# Patient Record
Sex: MALE | Race: WHITE | NOT HISPANIC OR LATINO | Employment: OTHER | URBAN - METROPOLITAN AREA
[De-identification: names, ages, dates, MRNs, and addresses within clinical notes are randomized per-mention and may not be internally consistent; named-entity substitution may affect disease eponyms.]

---

## 2019-07-31 ENCOUNTER — HOSPITAL ENCOUNTER (OUTPATIENT)
Dept: RADIOLOGY | Facility: HOSPITAL | Age: 69
Discharge: HOME | End: 2019-07-31
Attending: ORTHOPAEDIC SURGERY
Payer: MEDICARE

## 2019-07-31 DIAGNOSIS — M79.672 LEFT FOOT PAIN: Primary | ICD-10-CM

## 2019-07-31 DIAGNOSIS — M79.672 LEFT FOOT PAIN: ICD-10-CM

## 2019-07-31 PROCEDURE — 73630 XR FOOT COMPLETE 3 VIEW LEFT: ICD-10-PCS | Mod: 26,LT,, | Performed by: RADIOLOGY

## 2019-07-31 PROCEDURE — 73630 X-RAY EXAM OF FOOT: CPT | Mod: TC,LT

## 2019-07-31 PROCEDURE — 73630 X-RAY EXAM OF FOOT: CPT | Mod: 26,LT,, | Performed by: RADIOLOGY

## 2021-12-29 ENCOUNTER — LAB VISIT (OUTPATIENT)
Dept: FAMILY MEDICINE | Facility: CLINIC | Age: 71
End: 2021-12-29
Payer: MEDICARE

## 2021-12-29 DIAGNOSIS — U07.1 COVID-19: Primary | ICD-10-CM

## 2021-12-29 LAB
CTP QC/QA: YES
SARS-COV-2 AG RESP QL IA.RAPID: NEGATIVE

## 2021-12-29 PROCEDURE — 87811 SARS-COV-2 COVID19 W/OPTIC: CPT | Mod: PBBFAC

## 2025-06-13 ENCOUNTER — TELEPHONE (OUTPATIENT)
Dept: ENDOSCOPY | Facility: HOSPITAL | Age: 75
End: 2025-06-13
Payer: MEDICARE

## 2025-06-16 ENCOUNTER — TELEPHONE (OUTPATIENT)
Dept: ENDOSCOPY | Facility: HOSPITAL | Age: 75
End: 2025-06-16
Payer: MEDICARE

## 2025-06-16 NOTE — TELEPHONE ENCOUNTER
Per Dr. Cortes review EUS +/- ERCP within 7 days (CMP right before procedure to check LFTs) for uncinate process mass lesion with periportal lymph nodes as well, any provider, any campus, have Filiberto visit with patient virtually on Monday or Tuesday to review (not to delay the date of the procedure) and discuss procedures.

## 2025-06-17 ENCOUNTER — OFFICE VISIT (OUTPATIENT)
Dept: GASTROENTEROLOGY | Facility: CLINIC | Age: 75
End: 2025-06-17
Payer: MEDICARE

## 2025-06-17 ENCOUNTER — TELEPHONE (OUTPATIENT)
Dept: GASTROENTEROLOGY | Facility: CLINIC | Age: 75
End: 2025-06-17
Payer: MEDICARE

## 2025-06-17 VITALS — BODY MASS INDEX: 25.74 KG/M2 | HEIGHT: 72 IN | WEIGHT: 190.06 LBS

## 2025-06-17 DIAGNOSIS — D49.0 PANCREAS NEOPLASM: Primary | ICD-10-CM

## 2025-06-17 DIAGNOSIS — R79.89 ELEVATED LFTS: Primary | ICD-10-CM

## 2025-06-17 DIAGNOSIS — R93.89 ABNORMAL FINDINGS ON IMAGING TEST: ICD-10-CM

## 2025-06-17 DIAGNOSIS — D49.0 PANCREAS NEOPLASM: ICD-10-CM

## 2025-06-17 PROCEDURE — 99213 OFFICE O/P EST LOW 20 MIN: CPT | Mod: PBBFAC

## 2025-06-17 PROCEDURE — 99214 OFFICE O/P EST MOD 30 MIN: CPT | Mod: S$PBB,,,

## 2025-06-17 PROCEDURE — 99999 PR PBB SHADOW E&M-EST. PATIENT-LVL III: CPT | Mod: PBBFAC,,,

## 2025-06-17 RX ORDER — ROSUVASTATIN CALCIUM 40 MG/1
40 TABLET, COATED ORAL NIGHTLY
COMMUNITY

## 2025-06-17 RX ORDER — TAMSULOSIN HYDROCHLORIDE 0.4 MG/1
0.4 CAPSULE ORAL DAILY
COMMUNITY

## 2025-06-17 RX ORDER — PANTOPRAZOLE SODIUM 40 MG/1
40 TABLET, DELAYED RELEASE ORAL ONCE AS NEEDED
COMMUNITY

## 2025-06-17 NOTE — TELEPHONE ENCOUNTER
Patient is scheduled for a EUS/ERCP  on 06/20/2025 with Dr. HILARY Layne  Referral for procedure from Clinic visit.    Dr. Jefferson's office contacted to fax over Eliquis hold ASAP.

## 2025-06-17 NOTE — H&P (VIEW-ONLY)
[facility]    Reason for Visit:  There were no encounter diagnoses.    PCP:   Emile Jefferson   No address on file      Initial HPI   This is a 74 y.o. male with pmhx DVT on eliquis presenting for pancreatic mass. Patient initially had a CT renal stone study 6/10 which showed a 3.6 x 3 cm rounded region of soft tissue density inferior to the pancreatic uncinate process which appears newly developed from prior, abutting a duodenal diverticulum. Subsequent CT performed 6/12 which demonstrated a 2.9 x 2.1 x 2.9 cm hypodense mass in the uncinate process of the pancreas, consistent with pancreatic neoplasm. There is also multiple hypodense hepatic metastases measuring up to 2 cm in the posterior right lobe. Multiple metastatic periportal lymph nodes up to 3.4 cm. Unremarkable gallbladder, spleen, and adrenal glands.     LFTs 6/12 with Tbili 1.2, , AST 67, and ALT 65. CA 19-9 normal. CEA normal.  Patient endorses some intermittent LLQ discomfort. Denies any upper abdominal pain, NV, fever, jaundice. He has noticed about 9 lbs of weight loss recently. Denies pmhx of pancreatitis. He endorses that his brother possibly had pancreatic cancer, though he is unsure. Patient does not smoke. Denies hx of diabetes. He takes eliquis for recent DVT       ROS:  Review of Systems   Constitutional:  Positive for weight loss. Negative for chills and fever.   Gastrointestinal:  Positive for abdominal pain. Negative for blood in stool, constipation, diarrhea, heartburn, melena, nausea and vomiting.   Skin:  Negative for itching and rash.        Medical History:  has no past medical history on file.    Surgical History:  has no past surgical history on file.    Family History: family history is not on file..       Review of patient's allergies indicates:  Not on File    Medications Ordered Prior to Encounter[1]      Objective Findings: (Limited due to virtual nature of encounter)    Physical Exam:  Physical  Exam  Constitutional:       General: He is not in acute distress.     Appearance: Normal appearance. He is not ill-appearing.   Eyes:      General: No scleral icterus.  Abdominal:      General: Abdomen is flat. Bowel sounds are normal. There is no distension.      Palpations: Abdomen is soft. There is no mass.      Tenderness: There is no abdominal tenderness. There is no guarding or rebound.      Hernia: No hernia is present.   Skin:     General: Skin is warm and dry.      Coloration: Skin is not jaundiced or pale.   Neurological:      Mental Status: He is alert and oriented to person, place, and time. Mental status is at baseline.             Labs:  Lab Results   Component Value Date    WBC 11.90 06/12/2025    HGB 14.7 06/12/2025    HCT 44.0 06/12/2025    PLT 47 (LL) 06/12/2025    ALKPHOS 236 (H) 06/12/2025    ALT 65 (H) 06/12/2025    AST 67 (H) 06/12/2025     06/12/2025    K 5.0 06/12/2025     06/12/2025    CREATININE 1.88 (H) 06/12/2025    BUN 22 (H) 06/12/2025    CO2 28 06/12/2025             Assessment:  No diagnosis found.     This is a 74 y.o. male with pmhx DVT on eliquis presenting for pancreatic mass. Patient initially had a CT renal stone study 6/10 which showed a 3.6 x 3 cm rounded region of soft tissue density inferior to the pancreatic uncinate process which appears newly developed from prior, abutting a duodenal diverticulum. Subsequent CT performed 6/12 which demonstrated a 2.9 x 2.1 x 2.9 cm hypodense mass in the uncinate process of the pancreas, consistent with pancreatic neoplasm. There is also multiple hypodense hepatic metastases measuring up to 2 cm in the posterior right lobe. Multiple metastatic periportal lymph nodes up to 3.4 cm. Unremarkable gallbladder, spleen, and adrenal glands.     LFTs 6/12 with Tbili 1.2, , AST 67, and ALT 65. CA 19-9 normal. CEA normal.  Patient endorses some intermittent LLQ discomfort. Denies any upper abdominal pain, NV, fever, jaundice. He  has noticed about 9 lbs of weight loss recently. Denies pmhx of pancreatitis. He endorses that his brother possibly had pancreatic cancer, though he is unsure. Patient does not smoke. Denies hx of diabetes. He takes eliquis for recent DVT    Recommendations:  I will arrange for urgent EUS/+/- ERCP in the next week for evaluation of pancreatic mass. Will arrange for repeat LFTs to be performed the day of procedure to help determine if ERCP is needed  Explained the EUS/ERCP procedure in detail and answered the patients questions regarding this procedure  Went over the risks of the procedure including but not limited to risks of anesthesia, bleeding, perforation, infection, pancreatitis- he verbalized understanding of these risks and would like to proceed with EUS/ERCP  We will arrange for clearance to hold eliquis prior to procedure given recent DVT            Thank you so much for allowing me to participate in the care of Robert Lopez PA-C  Advanced Endoscopy Physician Assistant  [facility]                 [1]   No current outpatient medications on file prior to visit.     No current facility-administered medications on file prior to visit.

## 2025-06-18 ENCOUNTER — TELEPHONE (OUTPATIENT)
Dept: GASTROENTEROLOGY | Facility: CLINIC | Age: 75
End: 2025-06-18
Payer: MEDICARE

## 2025-06-20 ENCOUNTER — ANESTHESIA EVENT (OUTPATIENT)
Dept: ENDOSCOPY | Facility: HOSPITAL | Age: 75
End: 2025-06-20
Payer: MEDICARE

## 2025-06-20 ENCOUNTER — HOSPITAL ENCOUNTER (OUTPATIENT)
Facility: HOSPITAL | Age: 75
Discharge: HOME | End: 2025-06-20
Attending: INTERNAL MEDICINE | Admitting: INTERNAL MEDICINE
Payer: MEDICARE

## 2025-06-20 ENCOUNTER — ANESTHESIA (OUTPATIENT)
Dept: ENDOSCOPY | Facility: HOSPITAL | Age: 75
End: 2025-06-20
Payer: MEDICARE

## 2025-06-20 VITALS
BODY MASS INDEX: 25.73 KG/M2 | OXYGEN SATURATION: 99 % | RESPIRATION RATE: 20 BRPM | HEART RATE: 52 BPM | HEIGHT: 72 IN | WEIGHT: 190 LBS | SYSTOLIC BLOOD PRESSURE: 160 MMHG | TEMPERATURE: 98 F | DIASTOLIC BLOOD PRESSURE: 61 MMHG

## 2025-06-20 DIAGNOSIS — D49.0 PANCREAS NEOPLASM: ICD-10-CM

## 2025-06-20 DIAGNOSIS — K86.89 PANCREATIC MASS: ICD-10-CM

## 2025-06-20 PROCEDURE — 43242 EGD US FINE NEEDLE BX/ASPIR: CPT | Mod: ,,, | Performed by: INTERNAL MEDICINE

## 2025-06-20 PROCEDURE — 63600175 PHARM REV CODE 636 W HCPCS: Performed by: NURSE ANESTHETIST, CERTIFIED REGISTERED

## 2025-06-20 PROCEDURE — 43242 EGD US FINE NEEDLE BX/ASPIR: CPT | Performed by: INTERNAL MEDICINE

## 2025-06-20 PROCEDURE — 88305 TISSUE EXAM BY PATHOLOGIST: CPT | Mod: TC,59 | Performed by: INTERNAL MEDICINE

## 2025-06-20 PROCEDURE — 25000003 PHARM REV CODE 250: Performed by: NURSE ANESTHETIST, CERTIFIED REGISTERED

## 2025-06-20 RX ORDER — LIDOCAINE HYDROCHLORIDE 20 MG/ML
INJECTION INTRAVENOUS
Status: DISCONTINUED | OUTPATIENT
Start: 2025-06-20 | End: 2025-06-24

## 2025-06-20 RX ORDER — OXYCODONE AND ACETAMINOPHEN 5; 325 MG/1; MG/1
1 TABLET ORAL
Status: DISCONTINUED | OUTPATIENT
Start: 2025-06-20 | End: 2025-06-20 | Stop reason: HOSPADM

## 2025-06-20 RX ORDER — PROPOFOL 10 MG/ML
VIAL (ML) INTRAVENOUS
Status: DISCONTINUED | OUTPATIENT
Start: 2025-06-20 | End: 2025-06-24

## 2025-06-20 RX ORDER — KETOROLAC TROMETHAMINE 15 MG/ML
15 INJECTION, SOLUTION INTRAMUSCULAR; INTRAVENOUS EVERY 8 HOURS PRN
Status: DISCONTINUED | OUTPATIENT
Start: 2025-06-20 | End: 2025-06-20 | Stop reason: HOSPADM

## 2025-06-20 RX ORDER — HALOPERIDOL LACTATE 5 MG/ML
0.5 INJECTION, SOLUTION INTRAMUSCULAR EVERY 10 MIN PRN
Status: DISCONTINUED | OUTPATIENT
Start: 2025-06-20 | End: 2025-06-20 | Stop reason: HOSPADM

## 2025-06-20 RX ORDER — SODIUM CHLORIDE 0.9 % (FLUSH) 0.9 %
10 SYRINGE (ML) INJECTION
Status: DISCONTINUED | OUTPATIENT
Start: 2025-06-20 | End: 2025-06-20 | Stop reason: HOSPADM

## 2025-06-20 RX ORDER — GLUCAGON 1 MG
1 KIT INJECTION
Status: DISCONTINUED | OUTPATIENT
Start: 2025-06-20 | End: 2025-06-20 | Stop reason: HOSPADM

## 2025-06-20 RX ORDER — HYDROMORPHONE HYDROCHLORIDE 1 MG/ML
0.2 INJECTION, SOLUTION INTRAMUSCULAR; INTRAVENOUS; SUBCUTANEOUS EVERY 10 MIN PRN
Status: DISCONTINUED | OUTPATIENT
Start: 2025-06-20 | End: 2025-06-20 | Stop reason: HOSPADM

## 2025-06-20 RX ADMIN — GLYCOPYRROLATE 0.2 MG: 0.2 INJECTION, SOLUTION INTRAMUSCULAR; INTRAVENOUS at 01:06

## 2025-06-20 RX ADMIN — SODIUM CHLORIDE: 0.9 INJECTION, SOLUTION INTRAVENOUS at 01:06

## 2025-06-20 RX ADMIN — LIDOCAINE HYDROCHLORIDE 50 MG: 20 INJECTION INTRAVENOUS at 01:06

## 2025-06-20 RX ADMIN — PROPOFOL 50 MG: 10 INJECTION, EMULSION INTRAVENOUS at 01:06

## 2025-06-20 RX ADMIN — PROPOFOL 150 MCG/KG/MIN: 10 INJECTION, EMULSION INTRAVENOUS at 01:06

## 2025-06-20 NOTE — ANESTHESIA PREPROCEDURE EVALUATION
[facility]  Anesthesia Pre-Operative Evaluation     Patient Name: Robert Franco  YOB: 1950  MRN: [id number]  [facility]: [id number]       Admit Date: 6/20/2025   Admit Team: Networked reference to record PCT   Hospital Day: 1  Date of Procedure: 6/20/2025  Anesthesia: Choice Procedure: Procedure(s) (LRB):  ULTRASOUND, UPPER GI TRACT, ENDOSCOPIC (N/A)  ERCP (ENDOSCOPIC RETROGRADE CHOLANGIOPANCREATOGRAPHY) (N/A)  Pre-Operative Diagnosis: Pancreas neoplasm [D49.0]  Proceduralist:Surgeons and Role:     * Travis Layne MD - Primary  Code Status: Full Code   Advanced Directive: <no information>  Isolation Precautions: No active isolations  Capacity: Full capacity     SUBJECTIVE:   Robert Franco is a 74 y.o. male who  has a past medical history of GERD (gastroesophageal reflux disease).  No notes on file    Hospital LOS: 0 days  ICU LOS: Patient does not have an ICU stay during this admission.    he has a current medication list which includes the following long-term medication(s): pantoprazole, rosuvastatin, and tamsulosin.   Current Outpatient Medications   Medication Instructions    apixaban (ELIQUIS) 5 mg, 2 times daily    pantoprazole (PROTONIX) 40 mg, Once as needed    rosuvastatin (CRESTOR) 40 mg, Nightly    tamsulosin (FLOMAX) 0.4 mg, Daily     ALLERGIES:     Review of patient's allergies indicates:   Allergen Reactions    Pcn [penicillins]      LDA:   AIRWAY:         * No LDAs found *      Lines/Drains/Airways       Peripheral Intravenous Line  Duration             Peripheral IV 06/20/25 1208 20 G 1 in Left;Posterior Hand <1 day                   Anesthesia Evaluation      Airway   Mallampati: III  TM distance: Normal  Neck ROM: Normal ROM  Dental    (+) Intact    Pulmonary    Cardiovascular     Neuro/Psych      GI/Hepatic/Renal    (+) GERD    Endo/Other    Abdominal                    MEDICATIONS:     Current Outpatient Medications on File Prior to Encounter   Medication Sig  Dispense Refill Last Dose/Taking    apixaban (ELIQUIS) 5 mg Tab Take 5 mg by mouth 2 (two) times daily.   Past Week    pantoprazole (PROTONIX) 40 MG tablet Take 40 mg by mouth once as needed.   Past Week    rosuvastatin (CRESTOR) 40 MG Tab Take 40 mg by mouth every evening.   6/19/2025    tamsulosin (FLOMAX) 0.4 mg Cap Take 0.4 mg by mouth once daily.   6/19/2025      Inpatient Medications:  Antibiotics (From admission, onward)      None          VTE Risk Mitigation (From admission, onward)      None              Current Medications[1]       History:   There are no hospital problems to display for this patient.    Surgical History:    has a past surgical history that includes Appendectomy; Colonoscopy; and Polypectomy.   Social History:    has no history on file for sexual activity.  reports that he has quit smoking. His smoking use included cigarettes. He has never used smokeless tobacco. He reports that he does not use drugs.    Vitals:    06/20/25 1206   BP: (!) 148/62   BP Location: Right arm   Patient Position: Lying   Pulse: 72   Resp: 16   Temp: 36.8 °C (98.2 °F)   TempSrc: Temporal   SpO2: 97%   Weight: 86.2 kg (190 lb)   Height: 6' (1.829 m)     Vital Signs Range (Last 24H):  Temp:  [36.8 °C (98.2 °F)]   Pulse:  [72]   Resp:  [16]   BP: (148)/(62)   SpO2:  [97 %]     Body mass index is 25.77 kg/m².  Wt Readings from Last 4 Encounters:   06/20/25 86.2 kg (190 lb)   06/17/25 86.2 kg (190 lb 0.6 oz)        Intake/Output - Last 3 Shifts       None          Lab Results   Component Value Date    WBC 11.90 06/12/2025    HGB 14.7 06/12/2025    HCT 44.0 06/12/2025    PLT 47 (LL) 06/12/2025     06/12/2025    K 5.0 06/12/2025     06/12/2025    CREATININE 1.88 (H) 06/12/2025    BUN 22 (H) 06/12/2025    CO2 28 06/12/2025     (H) 06/12/2025    CALCIUM 9.7 06/12/2025    ALKPHOS 236 (H) 06/12/2025    ALT 65 (H) 06/12/2025    AST 67 (H) 06/12/2025    ALBUMIN 4.6 06/12/2025     No results found for this or  "any previous visit (from the past 12 hours).  No results for input(s): "WBC", "HGB", "HCT", "PLT", "NA", "K", "CREATININE", "GLU", "INR", "LACTATE", "5HIAAPLASMA", "5HIAAURINT", "5HIAA", "5PHIH15TY" in the last 168 hours.  No LMP for male patient.    EKG:   No results found for this or any previous visit.  TTE:  No results found for this or any previous visit.    DAMARIS:  No results found for this or any previous visit.    Stress Test:  No results found for this or any previous visit.    No results found for this or any previous visit.    LHC:  No results found for this or any previous visit.    Cardiac Device Check   No results found for this or any previous visit.    No results found for this or any previous visit.        Pre-op Assessment          Review of Systems  Hepatic/GI:     GERD         Gerd              Physical Exam  General: Well nourished    Airway:  Mallampati: III / II  Mouth Opening: Normal  TM Distance: Normal  Tongue: Normal  Neck ROM: Normal ROM    Dental:  Intact        Anesthesia Plan  Type of Anesthesia, risks & benefits discussed:    Anesthesia Type: Gen ETT, Gen Natural Airway, MAC  Intra-op Monitoring Plan: Standard ASA Monitors  Post Op Pain Control Plan: multimodal analgesia and IV/PO Opioids PRN  Induction:  IV  Airway Plan: Direct and Video, Post-Induction  Informed Consent: Informed consent signed with the Patient and all parties understand the risks and agree with anesthesia plan.  All questions answered.   ASA Score: 3  Day of Surgery Review of History & Physical: H&P completed by Anesthesiologist.    Ready For Surgery From Anesthesia Perspective.     .           [1]   Current Facility-Administered Medications   Medication Dose Route Frequency Provider Last Rate Last Admin    sodium chloride 0.9% flush 10 mL  10 mL Intravenous PRN Travis Layne MD         "

## 2025-06-20 NOTE — PLAN OF CARE
ID band and fall risk band applied to pt. plan of care reviewed. pt has no questions at this time.

## 2025-06-20 NOTE — PROVATION PATIENT INSTRUCTIONS
Discharge Summary/Instructions after an Endoscopic Procedure  Patient Name: Robert Franco  Patient MRN: [id number]  Patient YOB: 1950 Friday, June 20, 2025  Travis Layne MD  Dear patient,  As a result of recent federal legislation (The Federal Cures Act), you may   receive lab or pathology results from your procedure in your [facility]   account before your physician is able to contact you. Your physician or   their representative will relay the results to you with their   recommendations at their soonest availability.  Thank you,  RESTRICTIONS:  During your procedure today, you received medications for sedation.  These   medications may affect your judgment, balance and coordination.  Therefore,   for 24 hours, you have the following restrictions:   - DO NOT drive a car, operate machinery, make legal/financial decisions,   sign important papers or drink alcohol.    ACTIVITY:  Today: no heavy lifting, straining or running due to procedural   sedation/anesthesia.  The following day: return to full activity including work.  DIET:  Eat and drink normally unless instructed otherwise.     TREATMENT FOR COMMON SIDE EFFECTS:  - Mild abdominal pain, nausea, belching, bloating or excessive gas:  rest,   eat lightly and use a heating pad.  - Sore Throat: treat with throat lozenges and/or gargle with warm salt   water.  - Because air was used during the procedure, expelling large amounts of air   from your rectum or belching is normal.  - If a bowel prep was taken, you may not have a bowel movement for 1-3 days.    This is normal.  SYMPTOMS TO WATCH FOR AND REPORT TO YOUR PHYSICIAN:  1. Abdominal pain or bloating, other than gas cramps.  2. Chest pain.  3. Back pain.  4. Signs of infection such as: chills or fever occurring within 24 hours   after the procedure.  5. Rectal bleeding, which would show as bright red, maroon, or black stools.   (A tablespoon of blood from the rectum is not serious, especially if    hemorrhoids are present.)  6. Vomiting.  7. Weakness or dizziness.  GO DIRECTLY TO THE NEAREST EMERGENCY ROOM IF YOU HAVE ANY OF THE FOLLOWING:      Difficulty breathing              Chills and/or fever over 101 F   Persistent vomiting and/or vomiting blood   Severe abdominal pain   Severe chest pain   Black, tarry stools   Bleeding- more than one tablespoon   Any other symptom or condition that you feel may need urgent attention  Your doctor recommends these additional instructions:  If any biopsies were taken, your doctors clinic will contact you in 1 to 2   weeks with any results.  - Discharge patient to home (ambulatory).   - Resume previous diet; Discharge to home (ambulatory); Resume outpatient   medications  - Return to primary care physician as previously scheduled.   - Await cytology results. Will arrange for oncology referral pending final   results.  For questions, problems or results please call your physician - Travis Layne MD at Work:  [phone].  [facility], EMERGENCY ROOM PHONE NUMBER: [phone]  IF A COMPLICATION OR EMERGENCY SITUATION ARISES AND YOU ARE UNABLE TO REACH   YOUR PHYSICIAN - GO DIRECTLY TO THE EMERGENCY ROOM.  Travis Layne MD  6/20/2025 1:59:04 PM  This report has been verified and signed electronically.  Dear patient,  As a result of recent federal legislation (The Federal Cures Act), you may   receive lab or pathology results from your procedure in your [facility]   account before your physician is able to contact you. Your physician or   their representative will relay the results to you with their   recommendations at their soonest availability.  Thank you,  PROVATION

## 2025-06-24 LAB
ESTROGEN SERPL-MCNC: ABNORMAL PG/ML
INSULIN SERPL-ACNC: ABNORMAL U[IU]/ML
LAB AP CLINICAL INFORMATION: ABNORMAL
LAB AP COMMENTS: ABNORMAL
LAB AP GROSS DESCRIPTION: ABNORMAL
LAB AP NON-GYN INTERPRETATION SPECIMEN 1: ABNORMAL
LAB AP PATHOLOGIST ADEQUACY INTERP: ABNORMAL
LAB AP PERFORMING LOCATION(S): ABNORMAL
LAB AP REPORT FOOTNOTES: ABNORMAL

## 2025-06-24 NOTE — ANESTHESIA POSTPROCEDURE EVALUATION
Anesthesia Post Evaluation    Patient: Robert Franco    Procedure(s) Performed: Procedure(s) (LRB):  ULTRASOUND, UPPER GI TRACT, ENDOSCOPIC (N/A)    Final Anesthesia Type: general      Patient location during evaluation: PACU  Patient participation: Yes- Able to Participate  Level of consciousness: awake and alert  Post-procedure vital signs: reviewed and stable  Pain management: adequate  Airway patency: patent    PONV status at discharge: No PONV  Anesthetic complications: no      Cardiovascular status: blood pressure returned to baseline  Respiratory status: unassisted  Hydration status: euvolemic  Follow-up not needed.              Vitals Value Taken Time   /61 06/20/25 15:00   Temp 36.7 °C (98 °F) 06/20/25 15:00   Pulse 52 06/20/25 15:00   Resp 20 06/20/25 15:00   SpO2 99 % 06/20/25 15:00         No case tracking events are documented in the log.      Pain/Negrita Score: No data recorded

## 2025-06-25 ENCOUNTER — TELEPHONE (OUTPATIENT)
Dept: HEMATOLOGY/ONCOLOGY | Facility: CLINIC | Age: 75
End: 2025-06-25
Payer: MEDICARE

## 2025-06-25 ENCOUNTER — RESULTS FOLLOW-UP (OUTPATIENT)
Dept: GASTROENTEROLOGY | Facility: HOSPITAL | Age: 75
End: 2025-06-25

## 2025-06-25 DIAGNOSIS — C25.9 PANCREATIC ADENOCARCINOMA: Primary | ICD-10-CM

## 2025-06-25 DIAGNOSIS — C78.7 METASTASIS TO LIVER: ICD-10-CM

## 2025-06-25 NOTE — NURSING
Oncology Navigation   Intake  Date of Diagnosis: 06/25/25  Cancer Type: Pancreatic  Type of Referral: Internal  Date of Referral: 06/25/25  Initial Nurse Navigator Contact: 06/25/25  Referral to Initial Contact Timeline (days): 0  First Appointment Available: 07/02/25  Appointment Date: 07/02/25  First Available Date vs. Scheduled Date (days): 0     Treatment  Current Status: Staging work-up    Surgery: N/A    Medical Oncologist: LDu  Consult Date: 07/02/25       Procedures: Biopsy; CT; EUS / EGD  Biopsy Schedule Date: 06/20/25  CT Schedule Date: 06/12/25  EUS / EGD: EUS  EUS / EGD Date: 06/20/25             Support Systems: Spouse/significant other     Acuity      Follow Up  No follow-ups on file.

## 2025-06-27 ENCOUNTER — HOSPITAL ENCOUNTER (OUTPATIENT)
Dept: RADIOLOGY | Facility: HOSPITAL | Age: 75
Discharge: HOME | End: 2025-06-27
Attending: INTERNAL MEDICINE
Payer: MEDICARE

## 2025-06-27 ENCOUNTER — TELEPHONE (OUTPATIENT)
Dept: HEMATOLOGY/ONCOLOGY | Facility: CLINIC | Age: 75
End: 2025-06-27
Payer: MEDICARE

## 2025-06-27 DIAGNOSIS — C78.7 METASTASIS TO LIVER: ICD-10-CM

## 2025-06-27 DIAGNOSIS — C25.9 PANCREATIC ADENOCARCINOMA: Primary | ICD-10-CM

## 2025-06-27 DIAGNOSIS — C25.9 PANCREATIC ADENOCARCINOMA: ICD-10-CM

## 2025-06-27 PROCEDURE — 71250 CT THORAX DX C-: CPT | Mod: 26,,, | Performed by: RADIOLOGY

## 2025-06-27 PROCEDURE — 71250 CT THORAX DX C-: CPT | Mod: TC

## 2025-06-27 NOTE — TELEPHONE ENCOUNTER
Dr. Jacobson reviewed patient's most recent lab results. Pt has CR level of 2.4. Dr. Jacobson gave verbal orders for patient to drink at least 64oz per day and recheck CMP on Monday, 6/30.CMP orders placed and labs scheduled at [place]. I notified patient's spouse and she repeated the orders back with verbal understanding. Spouse is aware of patient's lab appointment on Monday, 6/30 at 4:00 PM

## 2025-06-30 ENCOUNTER — LAB VISIT (OUTPATIENT)
Dept: LAB | Facility: HOSPITAL | Age: 75
End: 2025-06-30
Attending: INTERNAL MEDICINE
Payer: MEDICARE

## 2025-06-30 DIAGNOSIS — C78.7 METASTASIS TO LIVER: ICD-10-CM

## 2025-06-30 DIAGNOSIS — C25.9 PANCREATIC ADENOCARCINOMA: ICD-10-CM

## 2025-06-30 LAB
ALBUMIN SERPL BCP-MCNC: 3.6 G/DL (ref 3.5–5.2)
ALP SERPL-CCNC: 568 UNIT/L (ref 40–150)
ALT SERPL W/O P-5'-P-CCNC: 126 UNIT/L (ref 10–44)
ANION GAP (OHS): 15 MMOL/L (ref 8–16)
AST SERPL-CCNC: 149 UNIT/L (ref 11–45)
BILIRUB SERPL-MCNC: 1.3 MG/DL (ref 0.1–1)
BUN SERPL-MCNC: 41 MG/DL (ref 8–23)
CALCIUM SERPL-MCNC: 8.9 MG/DL (ref 8.7–10.5)
CHLORIDE SERPL-SCNC: 105 MMOL/L (ref 95–110)
CO2 SERPL-SCNC: 22 MMOL/L (ref 23–29)
CREAT SERPL-MCNC: 3.3 MG/DL (ref 0.5–1.4)
GFR SERPLBLD CREATININE-BSD FMLA CKD-EPI: 19 ML/MIN/1.73/M2
GLUCOSE SERPL-MCNC: 138 MG/DL (ref 70–110)
POTASSIUM SERPL-SCNC: 4.5 MMOL/L (ref 3.5–5.1)
PROT SERPL-MCNC: 8.2 GM/DL (ref 6–8.4)
SODIUM SERPL-SCNC: 142 MMOL/L (ref 136–145)

## 2025-06-30 PROCEDURE — 84075 ASSAY ALKALINE PHOSPHATASE: CPT

## 2025-06-30 PROCEDURE — 36415 COLL VENOUS BLD VENIPUNCTURE: CPT

## 2025-07-01 ENCOUNTER — PATIENT MESSAGE (OUTPATIENT)
Dept: HEMATOLOGY/ONCOLOGY | Facility: CLINIC | Age: 75
End: 2025-07-01
Payer: MEDICARE

## 2025-07-01 DIAGNOSIS — C78.7 METASTASIS TO LIVER: ICD-10-CM

## 2025-07-01 DIAGNOSIS — C25.9 PANCREATIC ADENOCARCINOMA: Primary | ICD-10-CM

## 2025-07-01 PROBLEM — N17.9 AKI (ACUTE KIDNEY INJURY): Status: ACTIVE | Noted: 2025-07-01

## 2025-07-01 PROBLEM — D69.6 THROMBOCYTOPENIA: Status: ACTIVE | Noted: 2025-07-01

## 2025-07-02 PROBLEM — I82.432 ACUTE DEEP VEIN THROMBOSIS (DVT) OF POPLITEAL VEIN OF LEFT LOWER EXTREMITY: Status: ACTIVE | Noted: 2025-07-02

## 2025-07-04 PROBLEM — I82.4Z2: Status: ACTIVE | Noted: 2025-07-04

## 2025-07-09 PROBLEM — K92.0 HEMATEMESIS: Status: ACTIVE | Noted: 2025-07-09

## 2025-07-10 PROBLEM — Z51.5 COMFORT MEASURES ONLY STATUS: Status: ACTIVE | Noted: 2025-07-10

## 2025-07-25 ENCOUNTER — PATIENT MESSAGE (OUTPATIENT)
Dept: HEMATOLOGY/ONCOLOGY | Facility: CLINIC | Age: 75
End: 2025-07-25
Payer: MEDICARE